# Patient Record
Sex: MALE | Race: WHITE | NOT HISPANIC OR LATINO | Employment: FULL TIME | URBAN - METROPOLITAN AREA
[De-identification: names, ages, dates, MRNs, and addresses within clinical notes are randomized per-mention and may not be internally consistent; named-entity substitution may affect disease eponyms.]

---

## 2017-08-10 ENCOUNTER — OFFICE VISIT (OUTPATIENT)
Dept: SURGERY | Facility: CLINIC | Age: 42
End: 2017-08-10
Payer: COMMERCIAL

## 2017-08-10 VITALS
SYSTOLIC BLOOD PRESSURE: 142 MMHG | DIASTOLIC BLOOD PRESSURE: 91 MMHG | HEART RATE: 72 BPM | WEIGHT: 158.31 LBS | TEMPERATURE: 98 F

## 2017-08-10 DIAGNOSIS — R10.9 ABDOMINAL PAIN, UNSPECIFIED LOCATION: Primary | ICD-10-CM

## 2017-08-10 PROCEDURE — 99204 OFFICE O/P NEW MOD 45 MIN: CPT | Mod: S$GLB,,, | Performed by: SURGERY

## 2017-08-10 PROCEDURE — 99999 PR PBB SHADOW E&M-NEW PATIENT-LVL III: CPT | Mod: PBBFAC,,, | Performed by: SURGERY

## 2017-08-10 RX ORDER — OMEPRAZOLE 40 MG/1
CAPSULE, DELAYED RELEASE ORAL
COMMUNITY
Start: 2017-06-06 | End: 2017-09-08 | Stop reason: ALTCHOICE

## 2017-08-10 NOTE — LETTER
August 11, 2017      [place]  [address]  [place] [address]           [facility]  [address]  [facility] [address]  Phone: [phone]          Patient: Alex Green   MR Number: [id number]   YOB: 1975   Date of Visit: 8/10/2017       Dear Dr. Rafita Lopezor:    Thank you for referring Alex Green to me for evaluation. Attached you will find relevant portions of my assessment and plan of care.    If you have questions, please do not hesitate to call me. I look forward to following Alex Green along with you.    Sincerely,    Renaldo Oliver MD    Enclosure  CC:  No Recipients    If you would like to receive this communication electronically, please contact [email or web address] or [phone] to request more information on [employer] Link access.    For providers and/or their staff who would like to refer a patient to [facility], please contact us through our one-stop-shop provider referral line, [facility], at [phone].    If you feel you have received this communication in error or would no longer like to receive these types of communications, please e-mail [email or web address]

## 2017-08-11 NOTE — PROGRESS NOTES
Subjective:       Patient ID: Alex Green is a 41 y.o. male.    Chief Complaint: Consult (diverticulitis)    HPI 41-year-old male presents with a long history of recurring diverticulitis.  This first started back in 2009.  He is having about 3-4 tacks per year.  They seem to be coming more frequently.  He has some nausea but no significant vomiting.  He has never had fever.  He has been treated with oral antibiotics only.  He has never been admitted.  He has never had a CT scan.  He had a recent colonoscopy which revealed only diverticulosis of the sigmoid colon with some evidence of diverticulitis.  His had some benign polyps removed in the past.  His only surgical history is with some tooth extraction and a vasectomy.  He's never had any GI bleeding.  His stools are normal which she has a couple a day.  He says the caliber of the stools has decreased.    Review of Systems   Constitutional: Negative for chills, fatigue, fever and unexpected weight change.   HENT: Negative for congestion, sore throat and voice change.    Eyes: Negative for redness and visual disturbance.   Respiratory: Negative for cough, shortness of breath and wheezing.    Cardiovascular: Negative for chest pain and palpitations.   Gastrointestinal: Positive for abdominal pain (he has some lower abdominal tenderness.) and nausea. Negative for blood in stool and vomiting.   Genitourinary: Negative for dysuria, frequency, hematuria and urgency.   Musculoskeletal: Negative for arthralgias, myalgias and neck pain.   Skin: Negative for rash and wound.   Allergic/Immunologic: Negative.    Neurological: Negative for tremors, seizures, weakness and headaches.   Hematological: Does not bruise/bleed easily.   Psychiatric/Behavioral: Negative for confusion and dysphoric mood. The patient is not nervous/anxious.      Objective:     Physical Exam   Constitutional: He is oriented to person, place, and time. He appears well-developed and well-nourished. No  distress.   HENT:   Head: Normocephalic and atraumatic.   Mouth/Throat: Oropharynx is clear and moist. No oropharyngeal exudate.   Eyes: Conjunctivae and EOM are normal. Pupils are equal, round, and reactive to light. No scleral icterus.   Neck: Normal range of motion. Neck supple. No thyromegaly present.   Cardiovascular: Normal rate, regular rhythm, normal heart sounds and intact distal pulses.    No murmur heard.  Pulmonary/Chest: Effort normal and breath sounds normal. No respiratory distress. He has no wheezes. He has no rales.   Abdominal: Soft. Bowel sounds are normal. He exhibits no distension and no mass. There is tenderness (he has some lower abdominal tenderness.  This extends to the  left.). There is no guarding. No hernia.   Musculoskeletal: Normal range of motion. He exhibits no edema or tenderness.   Lymphadenopathy:     He has no cervical adenopathy.   Neurological: He is alert and oriented to person, place, and time. No cranial nerve deficit.   Skin: Skin is warm and dry. No rash noted. No erythema.   Psychiatric: He has a normal mood and affect. His behavior is normal. Judgment normal.     Endoscopy report and images were reviewed.    Assessment:     Encounter Diagnosis   Name Primary?    Abdominal pain, unspecified location Yes       Plan:      1.  CT scan of the abdomen and pelvis.  He has never had this done.  2.  I will follow-up with him after that.

## 2017-08-15 ENCOUNTER — HOSPITAL ENCOUNTER (OUTPATIENT)
Dept: RADIOLOGY | Facility: HOSPITAL | Age: 42
Discharge: HOME | End: 2017-08-15
Attending: SURGERY
Payer: COMMERCIAL

## 2017-08-15 DIAGNOSIS — R10.9 ABDOMINAL PAIN, UNSPECIFIED LOCATION: ICD-10-CM

## 2017-08-15 PROCEDURE — 25500020 PHARM REV CODE 255

## 2017-08-15 PROCEDURE — 74177 CT ABD & PELVIS W/CONTRAST: CPT | Mod: TC

## 2017-08-15 PROCEDURE — 74177 CT ABD & PELVIS W/CONTRAST: CPT | Mod: 26,,, | Performed by: RADIOLOGY

## 2017-08-15 RX ADMIN — IOHEXOL 30 ML: 350 INJECTION, SOLUTION INTRAVENOUS at 08:08

## 2017-08-15 RX ADMIN — IOHEXOL 75 ML: 350 INJECTION, SOLUTION INTRAVENOUS at 08:08

## 2017-09-08 ENCOUNTER — OFFICE VISIT (OUTPATIENT)
Dept: SURGERY | Facility: CLINIC | Age: 42
End: 2017-09-08
Payer: COMMERCIAL

## 2017-09-08 VITALS — SYSTOLIC BLOOD PRESSURE: 117 MMHG | WEIGHT: 155 LBS | DIASTOLIC BLOOD PRESSURE: 80 MMHG | HEART RATE: 58 BPM

## 2017-09-08 DIAGNOSIS — K57.32 DIVERTICULITIS OF LARGE INTESTINE WITHOUT PERFORATION OR ABSCESS WITHOUT BLEEDING: ICD-10-CM

## 2017-09-08 PROCEDURE — 99999 PR PBB SHADOW E&M-EST. PATIENT-LVL III: CPT | Mod: PBBFAC,,, | Performed by: COLON & RECTAL SURGERY

## 2017-09-08 PROCEDURE — 99204 OFFICE O/P NEW MOD 45 MIN: CPT | Mod: S$GLB,,, | Performed by: COLON & RECTAL SURGERY

## 2017-09-08 RX ORDER — MOXIFLOXACIN HYDROCHLORIDE 400 MG/1
400 TABLET ORAL DAILY
Qty: 14 TABLET | Refills: 2 | Status: SHIPPED | OUTPATIENT
Start: 2017-09-08

## 2017-09-08 NOTE — PROGRESS NOTES
"CRS Office Visit    SUBJECTIVE:     Chief Complaint: Diverticulitis     History of Present Illness:  Patient is a 41 y.o. male presents with chief complaint of recurrent diverticulitis. The patient is a new patient to this practice referred by Dr. Oliver, who is a friend of his. He reports he has had episodes of uncomplicated diverticulitis for the last 8 years. He describes the episodes as LLQ crampy abdominal pain associated initially with loose stool then pencil-thin stools. He has been managed with outpatient antibiotics. He reports it now takes the antibiotics (cipro/flagyl) longer to relieve the pain. He has never required admission for diverticulitis. He has episodes "quarterly" according to him and his wife. He has had 3-4 episodes so far in 2017. These episodes definitely interfere with his daily life. He has to alter his diet to soft or full liquids. His energy level can be down for weeks at a time.     He has had two colonoscopies - 1 was 8 years ago. The other was within the last month. Both showed diverticula. He had a CT scan 8/15/17 which showed sigmoid diverticulitis with surrounding inflammation without evidence of abscess or perforation. He was asymptomatic at the time of the exam. He is here today to discuss possible sigmoidectomy.     No previous abdominal surgeries. Otherwise healthy.     No family history of CRC or IBD.       Review of patient's allergies indicates:  No Known Allergies    Past Medical History:   Diagnosis Date    Diverticulitis large intestine      History reviewed. No pertinent surgical history.  History reviewed. No pertinent family history.  Social History   Substance Use Topics    Smoking status: Current Some Day Smoker    Smokeless tobacco: Never Used    Alcohol use No      Comment: occasional        Review of Systems:  Constitutional: no fever or chills, pain well controlled  Eyes: no visual changes  ENT: no nasal congestion or sore throat  Respiratory: no cough or " shortness of breath, no snoring or difficulty sleeping  Cardiovascular: no chest pain or palpitations  Gastrointestinal: no nausea or vomiting, tolerating diet  Genitourinary: no hematuria or dysuria  Hematologic/Lymphatic: no easy bruising or lymphadenopathy  Musculoskeletal: no arthralgias or myalgias  Neurological: no seizures or tremors    OBJECTIVE:     Vital Signs (Most Recent)  Pulse: (!) 58 (09/08/17 1002)  BP: 117/80 (09/08/17 1002)    Physical Exam:  General: White male in NAD sitting in chair in clinic  Neuro: aaox4 maex4 perrl  Respiratory: resps even unlabored  Cardiac: cap refill <2 sec  Abdomen: Abdomen soft, nontender. BS normal. No masses, organomegaly or scars.  Extremities: Warm dry and intact  Anorectal: deferred    ASSESSMENT/PLAN:     Diagnoses and all orders for this visit:    Diverticulitis of large intestine without perforation or abscess without bleeding  -     Case Request Operating Room: SIGMOIDECTOMY-LAPAROSCOPIC    Other orders  -     moxifloxacin (AVELOX) 400 mg tablet; Take 1 tablet (400 mg total) by mouth once daily.    Recurrent uncomplicated diverticulitis, likely chronic. Discussed that given the number and frequency of episodes that he is experiencing, we would recommend sigmoid colectomy. We discussed laparoscopic sigmoidectomy, risks, benefits and alternatives. He wants to proceed before Thanksgiving.     - Tentative lap sigmoidectomy 11/14/2017 with Dr. Carmichael  - Given avelox rx in case he has acute flares in the interim - 7-10 days duration   - Will need to be seen again in clinic within 30 days of surgery    Heather Lynne MD  Colon and Rectal Surgery Fellow    I have personally taken the history and examined this patient and agree with the resident's note as stated above.    Remberto Carmichael

## 2017-09-08 NOTE — LETTER
September 11, 2017      Renaldo Oliver MD  [address]  [address]  [facility] [address]           [facility]  [address]  [facility] [address]  Phone: [phone]          Patient: Alex Green   MR Number: [id number]   YOB: 1975   Date of Visit: 9/8/2017       Dear Dr. Renaldo Oliver:    Thank you for referring Alex Green to me for evaluation. Attached you will find relevant portions of my assessment and plan of care.    If you have questions, please do not hesitate to call me. I look forward to following Alex Green along with you.    Sincerely,    Remberto Carmichael MD    Enclosure  CC:  No Recipients    If you would like to receive this communication electronically, please contact [email or web address] or [phone] to request more information on [employer] Link access.    For providers and/or their staff who would like to refer a patient to [facility], please contact us through our one-stop-shop provider referral line, [facility], at [phone].    If you feel you have received this communication in error or would no longer like to receive these types of communications, please e-mail [email or web address]

## 2017-09-25 ENCOUNTER — TELEPHONE (OUTPATIENT)
Dept: SURGERY | Facility: CLINIC | Age: 42
End: 2017-09-25

## 2017-09-25 NOTE — TELEPHONE ENCOUNTER
Spoke with pharmacy.  States that the prescription was on hold unnecessarily. Pharmacy will call patient.

## 2017-09-25 NOTE — TELEPHONE ENCOUNTER
----- Message from Dottie Olvera sent at 9/25/2017  4:09 PM CDT -----  Contact: pt#[phone]  She states that [facility] never received medication. Please call  moxifloxacin (AVELOX) 400 mg tablet

## 2017-10-23 DIAGNOSIS — Z01.818 PREOP TESTING: Primary | ICD-10-CM

## 2017-11-03 ENCOUNTER — TELEPHONE (OUTPATIENT)
Dept: SURGERY | Facility: CLINIC | Age: 42
End: 2017-11-03

## 2017-11-03 NOTE — TELEPHONE ENCOUNTER
Spoke with patient.  Patient canceled surgery. Patient is seeking a second opinion before doing the surgery.

## 2017-11-03 NOTE — TELEPHONE ENCOUNTER
----- Message from Radha Rodarte sent at 11/3/2017  9:58 AM CDT -----  Contact: Pt# [phone]  Pt states he was calling to cancel surgery 11/14 and would like to speak to the nurse #[phone]

## 2017-11-09 ENCOUNTER — TELEPHONE (OUTPATIENT)
Dept: SURGERY | Facility: CLINIC | Age: 42
End: 2017-11-09

## 2017-11-09 NOTE — TELEPHONE ENCOUNTER
----- Message from Dottie Olvera sent at 11/9/2017  2:51 PM CST -----  Contact: pt#[phone]  Pt is calling to get an auth cancelled. Please call

## 2017-11-09 NOTE — TELEPHONE ENCOUNTER
Spoke with patient.  Informed patient that I talked to our pre-authorization office(Cleo Buckley) about discontinuing the authorization from [facility] so that the patient can have surgery at another facility.